# Patient Record
Sex: MALE | Race: AMERICAN INDIAN OR ALASKA NATIVE
[De-identification: names, ages, dates, MRNs, and addresses within clinical notes are randomized per-mention and may not be internally consistent; named-entity substitution may affect disease eponyms.]

---

## 2018-12-27 ENCOUNTER — HOSPITAL ENCOUNTER (EMERGENCY)
Dept: HOSPITAL 5 - ED | Age: 43
Discharge: HOME | End: 2018-12-27
Payer: COMMERCIAL

## 2018-12-27 VITALS — SYSTOLIC BLOOD PRESSURE: 98 MMHG | DIASTOLIC BLOOD PRESSURE: 59 MMHG

## 2018-12-27 DIAGNOSIS — B34.9: Primary | ICD-10-CM

## 2018-12-27 DIAGNOSIS — F17.200: ICD-10-CM

## 2018-12-27 PROCEDURE — 99284 EMERGENCY DEPT VISIT MOD MDM: CPT

## 2018-12-27 PROCEDURE — 70450 CT HEAD/BRAIN W/O DYE: CPT

## 2018-12-27 PROCEDURE — 87400 INFLUENZA A/B EACH AG IA: CPT

## 2018-12-27 PROCEDURE — 96372 THER/PROPH/DIAG INJ SC/IM: CPT

## 2018-12-27 NOTE — CAT SCAN REPORT
CT HEAD WITHOUT CONTRAST:



HISTORY:  Headache.



TECHNIQUE:  Sequential 2.5mm CT images.



COMPARISON: none.



FINDINGS:



Cerebral Parenchyma: Within normal limits.



Cerebellum:  Within normal limits.



Brainstem:  Within normal limits.



Ventricles: Normal.



Sella:  Normal.



Extra-axial spaces:  Normal.



Basal Cisterns:  Normal.



Intracranial Hemorrhage:  None.



Midline Shift:  None.



Calvarium: Normal.



Sinuses: There is mild mucosal thickening throughout the ethmoid and 

right maxillary sinuses.



Mastoid Air Cells:  Normal.



Visualized Orbits:  Normal.







IMPRESSION:

No acute intracranial process. Mild chronic sinus disease.

## 2018-12-27 NOTE — EMERGENCY DEPARTMENT REPORT
HPI





- General


Chief Complaint: Upper Respiratory Infection


Time Seen by Provider: 12/27/18 10:05





- HPI


HPI: 


43-year-old -American male presents to the emergency department with 

complaint of a four-day history of a headache that has been going on behind the 

left eye, along with some body aches and chills.  This started on Rayna annel.

 The patient tried some vitamin C and has since also tried some Aleve and 

aspirin without much relief.  The headache intensity will wax and wane but has 

not resolved completely.  No vision change, slurred speech.  He denies any sore 

throat, cough, chest pain or shortness of breath.  No recent travel or sick co

ntacts at home.  No past medical history or primary care physician.








ED Past Medical Hx





- Past Medical History


Previous Medical History?: No





- Surgical History


Past Surgical History?: No





- Social History


Smoking Status: Current Every Day Smoker


Substance Use Type: Alcohol, Marijuana





- Medications


Home Medications: 


                                Home Medications











 Medication  Instructions  Recorded  Confirmed  Last Taken  Type


 


Fluticasone [Flonase] 1 spray NS QDAY #1 bottle 12/27/18  Unknown Rx


 


HYDROcodone/APAP 5-325 [[place] 1 each PO Q6HR PRN #10 tablet 12/27/18  Unknown Rx





5/325]     














ED Review of Systems


ROS: 


Stated complaint: FLU LIKE SYMPTOMS


Other details as noted in HPI





Comment: All other systems reviewed and negative


Constitutional: chills.  denies: malaise


Eyes: denies: eye pain, vision change


ENT: denies: ear pain, throat pain


Respiratory: denies: cough, shortness of breath


Cardiovascular: denies: chest pain, palpitations


Gastrointestinal: denies: nausea, vomiting


Genitourinary: denies: dysuria


Musculoskeletal: myalgia.  denies: joint swelling


Skin: denies: rash, lesions


Neurological: headache.  denies: numbness, paresthesias





Physical Exam





- Physical Exam


Vital Signs: 


                                   Vital Signs











  12/27/18





  09:40


 


Temperature 97.9 F


 


Pulse Rate 76


 


Respiratory 16





Rate 


 


Blood Pressure 98/59


 


O2 Sat by Pulse 99





Oximetry 











Physical Exam: 





GENERAL: The patient is well-developed well-nourished.


HEENT: Normocephalic.  Atraumatic.   Patient has moist mucous membranes.  Boggy 

nasal mucosa.


EYES:  Extraocular motions are intact.  Pupils are equal and reactive to light 

bilaterally.  No nystagmus.


NECK: Supple.  Trachea is midline.


CHEST/LUNGS: Clear to auscultation.  There is no respiratory distress noted.  


HEART/CARDIOVASCULAR: Regular.  There is no tachycardia.  There is no obvious 

murmur.


ABDOMEN:   There is no abdominal distention.


SKIN: Skin is warm and dry.


NEURO: The patient is awake, alert, and oriented.  The patient is cooperative.  

The patient has no focal neurologic deficits.  The patient has normal speech.  

Cranial nerves II through XII grossly intact.


MUSCULOSKELETAL: There is no tenderness or deformity.  There is no limitation 

range of motion.  There is no evidence of acute injury.





ED Course


                                   Vital Signs











  12/27/18





  09:40


 


Temperature 97.9 F


 


Pulse Rate 76


 


Respiratory 16





Rate 


 


Blood Pressure 98/59


 


O2 Sat by Pulse 99





Oximetry 














ED Medical Decision Making





- Radiology Data


Radiology results: report reviewed





CT HEAD WITHOUT CONTRAST: 





HISTORY: Headache. 





TECHNIQUE: Sequential 2.5mm CT images. 





COMPARISON: none. 





FINDINGS: 





Cerebral Parenchyma: Within normal limits. 





Cerebellum: Within normal limits. 





Brainstem: Within normal limits. 





Ventricles: Normal. 





Sella: Normal. 





Extra-axial spaces: Normal. 





Basal Cisterns: Normal. 





Intracranial Hemorrhage: None. 





Midline Shift: None. 





Calvarium: Normal. 





Sinuses: There is mild mucosal thickening throughout the ethmoid and 


right maxillary sinuses. 





Mastoid Air Cells: Normal. 





Visualized Orbits: Normal. 











IMPRESSION: 


No acute intracranial process. Mild chronic sinus disease. 





Transcribed By: TTR 


Dictated By: MEENAKSHI MUNOZ JR, MD 


Electronically Authenticated By: MEENAKSHI MUNOZ JR, MD 


Signed Date/Time: 12/27/18 1057





- Medical Decision Making


Patient presents with a few days of a headache, some chills.  Negative for 

influenza.  Vital signs stable including being afebrile.  CT scan of the head d

id not show any bleed, shift, mass, ischemia or any other acute process.  He 

does not have any focal, motor or sensory deficits in her cranial nerves 

appeared intact.  She is seen ambulatory in the emergency department and appears

stable.  Patient has some boggy nasal mucosa and may just have a viral syndrome.

 Patient will be discharged home with some Flonase and a small amount of pain 

medication.  He has been instructed to follow up with primary care and return to

the ER for any worsening of his symptoms or any acute distress.








- Differential Diagnosis


brain bleed, tension headache, sinus headache, viral syndrome


Critical Care Time: No


Critical care attestation.: 


If time is entered above; I have spent that time in minutes in the direct care 

of this critically ill patient, excluding procedure time.








ED Disposition


Clinical Impression: 


 Viral syndrome





Headache


Qualifiers:


 Headache type: unspecified Headache chronicity pattern: unspecified pattern 

Intractability: not intractable Qualified Code(s): R51 - Headache





Disposition: DC-01 TO HOME OR SELFCARE


Is pt being admited?: No


Condition: Stable


Instructions:  Acute Headache (ED), Viral Syndrome (ED)


Additional Instructions: 


Please follow up with a primary care physician in the next few days.  Return to 

the emergency Department with any worsening of your symptoms or any acute 

distress.





You have been prescribed a medication that can be sedating.  Therefore, this 

medication cannot be taken prior to driving, working, being responsible for 

children, and cannot be mixed with alcohol of any quantity.


Prescriptions: 


Fluticasone [Flonase] 1 spray NS QDAY #1 bottle


HYDROcodone/APAP 5-325 [[place] 5/325] 1 each PO Q6HR PRN #10 tablet


 PRN Reason: Pain


Referrals: 


[facility] [Outside] - 3-5 Days


FLOYD MCCABE DO [Staff Physician] - 3-5 Days


PRIMARY CARE,MD [Primary Care Provider] - 3-5 Days